# Patient Record
Sex: FEMALE | Race: WHITE | NOT HISPANIC OR LATINO | URBAN - METROPOLITAN AREA
[De-identification: names, ages, dates, MRNs, and addresses within clinical notes are randomized per-mention and may not be internally consistent; named-entity substitution may affect disease eponyms.]

---

## 2019-04-16 PROBLEM — 88111009 CHANGE IN BOWEL HABIT: Status: ACTIVE | Noted: 2019-04-16

## 2022-01-18 ENCOUNTER — OFFICE VISIT (OUTPATIENT)
Dept: URBAN - METROPOLITAN AREA CLINIC 27 | Facility: CLINIC | Age: 52
End: 2022-01-18

## 2022-04-30 ENCOUNTER — TELEPHONE ENCOUNTER (OUTPATIENT)
Dept: URBAN - METROPOLITAN AREA CLINIC 121 | Facility: CLINIC | Age: 52
End: 2022-04-30

## 2022-04-30 RX ORDER — LINACLOTIDE 290 UG/1
1 CAPSULE PO QAM 30 MINUTES BEFORE FIRST MEAL CAPSULE, GELATIN COATED ORAL
OUTPATIENT
Start: 2018-03-27

## 2022-04-30 RX ORDER — LINACLOTIDE 290 UG/1
TAKE 1 CAPSULE BY MOUTH ONCE A DAY CAPSULE, GELATIN COATED ORAL
OUTPATIENT
Start: 2021-11-30

## 2022-04-30 RX ORDER — LINACLOTIDE 290 UG/1
1 CAPSULE PO QAM 30MIN BEFORE BREAKFAST CAPSULE, GELATIN COATED ORAL
OUTPATIENT
Start: 2020-11-24

## 2022-04-30 RX ORDER — LINACLOTIDE 290 UG/1
TAKE ONE CAPSULE BY MOUTH EVERY MORNING. TAKE 30 MINUTES BEFORE FIRST MEAL CAPSULE, GELATIN COATED ORAL
OUTPATIENT
Start: 2018-09-10

## 2022-04-30 RX ORDER — LINACLOTIDE 290 UG/1
TAKE 1 CAPSULE BY MOUTH ONCE A DAY CAPSULE, GELATIN COATED ORAL
OUTPATIENT
Start: 2021-11-30 | End: 2022-03-30

## 2022-04-30 RX ORDER — LINACLOTIDE 290 UG/1
TAKE 1 TABLET PO QD CAPSULE, GELATIN COATED ORAL
OUTPATIENT
Start: 2017-06-06

## 2022-04-30 RX ORDER — LINACLOTIDE 290 UG/1
TAKE 1 CAPSULE EVERY MORNING 30 MINUTES BEFORE FIRST MEAL CAPSULE, GELATIN COATED ORAL
OUTPATIENT
Start: 2020-04-15

## 2022-04-30 RX ORDER — LINACLOTIDE 290 UG/1
1 CAPSULE PO QAM 30MIN BEFORE BREAKFAST CAPSULE, GELATIN COATED ORAL
OUTPATIENT
Start: 2020-11-24 | End: 2021-11-30

## 2022-04-30 RX ORDER — LINACLOTIDE 290 UG/1
1 CAPSULE PO QAM 30 MINUTES BEFORE FIRST MEAL CAPSULE, GELATIN COATED ORAL
OUTPATIENT
Start: 2018-03-27 | End: 2019-04-16

## 2022-04-30 RX ORDER — LINACLOTIDE 290 UG/1
TAKE 1 CAPSULE EVERY MORNING 30 MINUTES BEFORE FIRST MEAL CAPSULE, GELATIN COATED ORAL
OUTPATIENT
Start: 2020-04-15 | End: 2021-11-30

## 2022-05-01 ENCOUNTER — TELEPHONE ENCOUNTER (OUTPATIENT)
Dept: URBAN - METROPOLITAN AREA CLINIC 121 | Facility: CLINIC | Age: 52
End: 2022-05-01

## 2022-05-01 RX ORDER — LINACLOTIDE 290 UG/1
TAKE 1 CAPSULE BY MOUTH ONCE A DAY CAPSULE, GELATIN COATED ORAL
Status: ACTIVE | COMMUNITY
Start: 2021-11-30 | End: 2023-01-14

## 2022-05-01 RX ORDER — TRAZODONE HYDROCHLORIDE 50 MG/1
TABLET ORAL
Status: ACTIVE | COMMUNITY
Start: 2019-04-16

## 2022-08-17 ENCOUNTER — LAB OUTSIDE AN ENCOUNTER (OUTPATIENT)
Dept: URBAN - METROPOLITAN AREA CLINIC 27 | Facility: CLINIC | Age: 52
End: 2022-08-17

## 2022-08-23 LAB — OCCULT BLOOD, FECAL, IA: (no result)

## 2022-08-29 PROBLEM — 14760008 CONSTIPATION: Status: ACTIVE | Noted: 2017-06-06

## 2022-09-27 ENCOUNTER — CLAIMS CREATED FROM THE CLAIM WINDOW (OUTPATIENT)
Dept: URBAN - METROPOLITAN AREA SURGERY CENTER 7 | Facility: SURGERY CENTER | Age: 52
End: 2022-09-27
Payer: COMMERCIAL

## 2022-09-27 ENCOUNTER — WEB ENCOUNTER (OUTPATIENT)
Dept: URBAN - METROPOLITAN AREA SURGERY CENTER 7 | Facility: SURGERY CENTER | Age: 52
End: 2022-09-27

## 2022-09-27 DIAGNOSIS — K64.0 BLEEDING GRADE I HEMORRHOIDS: ICD-10-CM

## 2022-09-27 DIAGNOSIS — R19.5 ABNORMAL CONSISTENCY OF STOOL: ICD-10-CM

## 2022-09-27 PROCEDURE — G8907 PT DOC NO EVENTS ON DISCHARG: HCPCS | Performed by: INTERNAL MEDICINE

## 2022-09-27 PROCEDURE — 45378 DIAGNOSTIC COLONOSCOPY: CPT | Performed by: INTERNAL MEDICINE

## 2022-09-27 RX ORDER — LINACLOTIDE 290 UG/1
TAKE 1 CAPSULE BY MOUTH ONCE A DAY CAPSULE, GELATIN COATED ORAL
Status: ACTIVE | COMMUNITY
Start: 2021-11-30 | End: 2023-01-14

## 2022-09-27 RX ORDER — TRAZODONE HYDROCHLORIDE 50 MG/1
TABLET ORAL
Status: ACTIVE | COMMUNITY
Start: 2019-04-16

## 2022-11-22 ENCOUNTER — TELEPHONE ENCOUNTER (OUTPATIENT)
Dept: URBAN - METROPOLITAN AREA CLINIC 27 | Facility: CLINIC | Age: 52
End: 2022-11-22

## 2022-11-22 RX ORDER — LINACLOTIDE 290 UG/1
TAKE 1 CAPSULE CAPSULE, GELATIN COATED ORAL ONCE A DAY
Qty: 30 | Refills: 3
Start: 2021-11-30 | End: 2023-03-23

## 2023-01-25 ENCOUNTER — WEB ENCOUNTER (OUTPATIENT)
Dept: URBAN - METROPOLITAN AREA CLINIC 27 | Facility: CLINIC | Age: 53
End: 2023-01-25

## 2023-01-25 RX ORDER — LINACLOTIDE 290 UG/1
TAKE 1 CAPSULE CAPSULE, GELATIN COATED ORAL ONCE A DAY
Qty: 30 | Refills: 3
Start: 2021-11-30 | End: 2023-05-27

## 2023-02-14 ENCOUNTER — WEB ENCOUNTER (OUTPATIENT)
Dept: URBAN - METROPOLITAN AREA CLINIC 27 | Facility: CLINIC | Age: 53
End: 2023-02-14

## 2023-02-14 RX ORDER — LINACLOTIDE 290 UG/1
TAKE 1 CAPSULE CAPSULE, GELATIN COATED ORAL ONCE A DAY
Qty: 30 | Refills: 3
Start: 2021-11-30 | End: 2023-06-14

## 2023-07-03 ENCOUNTER — TELEPHONE ENCOUNTER (OUTPATIENT)
Dept: URBAN - METROPOLITAN AREA CLINIC 27 | Facility: CLINIC | Age: 53
End: 2023-07-03

## 2023-07-03 RX ORDER — LINACLOTIDE 290 UG/1
TAKE 1 CAPSULE CAPSULE, GELATIN COATED ORAL ONCE A DAY
Qty: 30 | Refills: 0
Start: 2021-11-30 | End: 2023-08-02

## 2023-07-03 RX ORDER — LINACLOTIDE 290 UG/1
1 CAPSULE AT LEAST 30 MINUTES BEFORE THE FIRST MEAL OF THE DAY ON AN EMPTY STOMACH CAPSULE, GELATIN COATED ORAL ONCE A DAY
Qty: 90 | Refills: 3
Start: 2021-11-30 | End: 2024-06-27

## 2023-07-11 ENCOUNTER — TELEPHONE ENCOUNTER (OUTPATIENT)
Dept: URBAN - METROPOLITAN AREA CLINIC 27 | Facility: CLINIC | Age: 53
End: 2023-07-11

## 2023-07-11 RX ORDER — LINACLOTIDE 290 UG/1
1 CAPSULE AT LEAST 30 MINUTES BEFORE THE FIRST MEAL OF THE DAY ON AN EMPTY STOMACH CAPSULE, GELATIN COATED ORAL ONCE A DAY
Qty: 30 | OUTPATIENT
Start: 2023-07-11 | End: 2023-08-10

## 2023-07-11 RX ORDER — LINACLOTIDE 290 UG/1
TAKE 1 CAPSULE CAPSULE, GELATIN COATED ORAL ONCE A DAY
Refills: 0
Start: 2021-11-30 | End: 2023-10-09

## 2023-09-11 ENCOUNTER — TELEPHONE ENCOUNTER (OUTPATIENT)
Dept: URBAN - METROPOLITAN AREA CLINIC 27 | Facility: CLINIC | Age: 53
End: 2023-09-11

## 2023-09-11 RX ORDER — LINACLOTIDE 290 UG/1
TAKE 1 CAPSULE CAPSULE, GELATIN COATED ORAL ONCE A DAY
Qty: 90 | Refills: 3
Start: 2021-11-30 | End: 2024-09-05

## 2024-05-23 ENCOUNTER — WEB ENCOUNTER (OUTPATIENT)
Dept: URBAN - METROPOLITAN AREA CLINIC 27 | Facility: CLINIC | Age: 54
End: 2024-05-23

## 2024-05-24 ENCOUNTER — WEB ENCOUNTER (OUTPATIENT)
Dept: URBAN - METROPOLITAN AREA CLINIC 27 | Facility: CLINIC | Age: 54
End: 2024-05-24

## 2024-08-15 ENCOUNTER — TELEPHONE ENCOUNTER (OUTPATIENT)
Dept: URBAN - METROPOLITAN AREA CLINIC 27 | Facility: CLINIC | Age: 54
End: 2024-08-15

## 2024-08-15 RX ORDER — LINACLOTIDE 290 UG/1
TAKE 1 CAPSULE CAPSULE, GELATIN COATED ORAL ONCE A DAY
Refills: 0
Start: 2021-11-30 | End: 2024-11-13

## 2024-09-23 ENCOUNTER — DASHBOARD ENCOUNTERS (OUTPATIENT)
Age: 54
End: 2024-09-23

## 2024-09-23 ENCOUNTER — OFFICE VISIT (OUTPATIENT)
Dept: URBAN - METROPOLITAN AREA CLINIC 27 | Facility: CLINIC | Age: 54
End: 2024-09-23
Payer: COMMERCIAL

## 2024-09-23 VITALS
HEART RATE: 63 BPM | HEIGHT: 67 IN | BODY MASS INDEX: 22.13 KG/M2 | SYSTOLIC BLOOD PRESSURE: 117 MMHG | WEIGHT: 141 LBS | DIASTOLIC BLOOD PRESSURE: 79 MMHG

## 2024-09-23 DIAGNOSIS — K59.09 CHANGE IN BOWEL MOVEMENTS INTERMITTENT CONSTIPATION. URGENCY IN THE MORNING.: ICD-10-CM

## 2024-09-23 PROCEDURE — 99213 OFFICE O/P EST LOW 20 MIN: CPT | Performed by: INTERNAL MEDICINE

## 2024-09-23 RX ORDER — LINACLOTIDE 290 UG/1
TAKE 1 CAPSULE CAPSULE, GELATIN COATED ORAL ONCE A DAY
Qty: 90 | Refills: 3
Start: 2021-11-30 | End: 2025-09-18

## 2024-09-23 RX ORDER — LINACLOTIDE 290 UG/1
TAKE 1 CAPSULE CAPSULE, GELATIN COATED ORAL ONCE A DAY
Refills: 0 | Status: ACTIVE | COMMUNITY
Start: 2021-11-30 | End: 2024-11-13

## 2024-09-23 RX ORDER — TRAZODONE HYDROCHLORIDE 50 MG/1
TABLET ORAL
Status: ACTIVE | COMMUNITY
Start: 2019-04-16

## 2024-09-23 NOTE — HPI-TODAY'S VISIT:
This is a 54-year-old female seen in consultation today for chronic constipation.  She takes Linzess 290 mcg a day and occasional MiraLAX few times a week.  She does not feel she is getting complete emptying.  Her weight is stable.  She takes estrogen progesterone but otherwise no complaints today.  No abdominal discomfort.

## 2024-11-08 ENCOUNTER — TELEPHONE ENCOUNTER (OUTPATIENT)
Dept: URBAN - METROPOLITAN AREA CLINIC 27 | Facility: CLINIC | Age: 54
End: 2024-11-08

## 2024-11-08 RX ORDER — LINACLOTIDE 290 UG/1
TAKE 1 CAPSULE CAPSULE, GELATIN COATED ORAL ONCE A DAY
Qty: 90 | Refills: 3
Start: 2021-11-30 | End: 2025-11-03

## 2025-02-25 ENCOUNTER — TELEPHONE ENCOUNTER (OUTPATIENT)
Dept: URBAN - METROPOLITAN AREA CLINIC 27 | Facility: CLINIC | Age: 55
End: 2025-02-25

## 2025-04-16 ENCOUNTER — OFFICE VISIT (OUTPATIENT)
Dept: URBAN - METROPOLITAN AREA CLINIC 27 | Facility: CLINIC | Age: 55
End: 2025-04-16
Payer: COMMERCIAL

## 2025-04-16 DIAGNOSIS — R19.4 CHANGE IN BOWEL HABIT: ICD-10-CM

## 2025-04-16 DIAGNOSIS — R19.7 DIARRHEA, UNSPECIFIED: ICD-10-CM

## 2025-04-16 DIAGNOSIS — K59.00 CONSTIPATION, UNSPECIFIED: ICD-10-CM

## 2025-04-16 PROCEDURE — 99214 OFFICE O/P EST MOD 30 MIN: CPT | Performed by: INTERNAL MEDICINE

## 2025-04-16 RX ORDER — LINACLOTIDE 290 UG/1
TAKE 1 CAPSULE CAPSULE, GELATIN COATED ORAL ONCE A DAY
Qty: 90 | Refills: 3 | Status: ACTIVE | COMMUNITY
Start: 2021-11-30 | End: 2025-11-03

## 2025-04-16 RX ORDER — PROGESTERONE 100 MG/1
TAKE ONE CAPSULE BY MOUTH AT BEDTIME CAPSULE ORAL
Qty: 30 UNSPECIFIED | Refills: 3 | Status: ACTIVE | COMMUNITY

## 2025-04-16 RX ORDER — ESTRADIOL 0.07 MG/D
APPLY ONE PATCH TO THE SKIN TWICE A WEEK PATCH TRANSDERMAL
Qty: 8 UNSPECIFIED | Refills: 0 | Status: ACTIVE | COMMUNITY

## 2025-04-16 RX ORDER — TRAZODONE HYDROCHLORIDE 50 MG/1
TABLET ORAL
Status: ACTIVE | COMMUNITY
Start: 2019-04-16

## 2025-04-16 NOTE — HPI-TODAY'S VISIT:
This is a 54-year-old female with chronic constipation seen in follow-up consultation today.  She was doing a bit better last year and is only taking Linzess 290 she was taking MiraLAX but is stopped it.  She tried Metamucil Gummies but that made her gassy.  She takes supplements and began probiotics about 2 months ago but in the last month she has had 2 episodes of diarrhea and is concerned that things are changing in her bowels.  In between she goes back to the constipation and she is continue the Linzess during this time.  No bleeding.  Otherwise feels well did have some cramping during the diarrhea.  She has not had previous abdominal surgery

## 2025-05-01 ENCOUNTER — LAB OUTSIDE AN ENCOUNTER (OUTPATIENT)
Dept: URBAN - METROPOLITAN AREA CLINIC 27 | Facility: CLINIC | Age: 55
End: 2025-05-01

## 2025-05-09 LAB — OCCULT BLOOD, FECAL, IA: (no result)

## 2025-05-29 ENCOUNTER — OFFICE VISIT (OUTPATIENT)
Dept: URBAN - METROPOLITAN AREA TELEHEALTH 2 | Facility: TELEHEALTH | Age: 55
End: 2025-05-29
Payer: COMMERCIAL

## 2025-05-29 DIAGNOSIS — R19.7 DIARRHEA, UNSPECIFIED: ICD-10-CM

## 2025-05-29 DIAGNOSIS — K59.00 CONSTIPATION, UNSPECIFIED: ICD-10-CM

## 2025-05-29 PROCEDURE — 99213 OFFICE O/P EST LOW 20 MIN: CPT | Performed by: INTERNAL MEDICINE

## 2025-05-29 RX ORDER — ESTRADIOL 0.07 MG/D
APPLY ONE PATCH TO THE SKIN TWICE A WEEK PATCH TRANSDERMAL
Qty: 8 UNSPECIFIED | Refills: 0 | Status: ACTIVE | COMMUNITY

## 2025-05-29 RX ORDER — LINACLOTIDE 290 UG/1
TAKE 1 CAPSULE CAPSULE, GELATIN COATED ORAL ONCE A DAY
Qty: 90 | Refills: 3 | Status: ACTIVE | COMMUNITY
Start: 2021-11-30 | End: 2025-11-03

## 2025-05-29 RX ORDER — TRAZODONE HYDROCHLORIDE 50 MG/1
TABLET ORAL
Status: ACTIVE | COMMUNITY
Start: 2019-04-16

## 2025-05-29 RX ORDER — PROGESTERONE 100 MG/1
TAKE ONE CAPSULE BY MOUTH AT BEDTIME CAPSULE ORAL
Qty: 30 UNSPECIFIED | Refills: 3 | Status: ACTIVE | COMMUNITY

## 2025-05-29 NOTE — HPI-TODAY'S VISIT:
This is a 54-year-old female seen in follow-up consultation for underlying constipation.  She takes Linzess 290 mcg a day and MiraLAX twice a week.  She is having bouts once or twice a week where she will get diarrhea overnight with 4-5 bowel movements.  Sort of everything opens up and empties.  Then she will go a day without a bowel movement.  He has been chronic in nature.  Recently FIT cards were negative.  Calprotectin was normal.  She had a colonoscopy 3 years ago

## 2025-06-04 ENCOUNTER — TELEPHONE ENCOUNTER (OUTPATIENT)
Dept: URBAN - METROPOLITAN AREA CLINIC 27 | Facility: CLINIC | Age: 55
End: 2025-06-04

## 2025-06-04 RX ORDER — LINACLOTIDE 290 UG/1
TAKE 1 CAPSULE CAPSULE, GELATIN COATED ORAL ONCE A DAY
Refills: 3
Start: 2021-11-30 | End: 2026-05-30